# Patient Record
Sex: MALE | Race: WHITE | Employment: FULL TIME | URBAN - METROPOLITAN AREA
[De-identification: names, ages, dates, MRNs, and addresses within clinical notes are randomized per-mention and may not be internally consistent; named-entity substitution may affect disease eponyms.]

---

## 2017-05-25 ENCOUNTER — HOSPITAL ENCOUNTER (EMERGENCY)
Age: 52
Discharge: HOME | End: 2017-05-25
Attending: EMERGENCY MEDICINE
Payer: OTHER GOVERNMENT

## 2017-05-25 VITALS
HEART RATE: 77 BPM | OXYGEN SATURATION: 97 % | HEIGHT: 71 IN | BODY MASS INDEX: 35 KG/M2 | SYSTOLIC BLOOD PRESSURE: 158 MMHG | DIASTOLIC BLOOD PRESSURE: 95 MMHG | WEIGHT: 250 LBS | RESPIRATION RATE: 18 BRPM | TEMPERATURE: 98 F

## 2017-05-25 DIAGNOSIS — M54.12 CERVICAL RADICULOPATHY: Primary | ICD-10-CM

## 2017-05-25 PROCEDURE — 99283 EMERGENCY DEPT VISIT LOW MDM: CPT

## 2017-05-25 RX ORDER — METHYLPREDNISOLONE 4 MG/1
TABLET ORAL
Qty: 1 PACKAGE | Refills: 0 | Status: SHIPPED | OUTPATIENT
Start: 2017-05-25 | End: 2017-05-30

## 2017-05-25 RX ORDER — NAPROXEN 500 MG/1
500 TABLET ORAL 2 TIMES DAILY PRN
Qty: 20 TABLET | Refills: 0 | Status: SHIPPED | OUTPATIENT
Start: 2017-05-25 | End: 2017-06-01

## 2017-05-25 NOTE — ED INITIAL ASSESSMENT (HPI)
LEFT SHOULDER PAIN RADIATE TO ARM AND HAND DENIES INJURY, STATES HE HAD THIS BEFORE WITH A TORN MUSCLE

## 2017-05-25 NOTE — ED PROVIDER NOTES
Patient Seen in: THE [facility] Emergency Department In [place]    History   Patient presents with:  Upper Extremity Injury (musculoskeletal)    Stated Complaint: left shoulder pain times three days     HPI    51-year-old male presents the emergency departmen Normocephalic. No evidence of trauma. Extraocular movements are intact. Pupils equally round reactive to light. Cervical spine is nontender. He has some tenderness along his left trapezius muscle group.   Cardiovascular exam: Regular rate and rhythm  Angela

## 2018-01-01 ENCOUNTER — APPOINTMENT (OUTPATIENT)
Dept: MRI IMAGING | Age: 53
DRG: 543 | End: 2018-01-01
Attending: EMERGENCY MEDICINE
Payer: COMMERCIAL

## 2018-01-01 ENCOUNTER — APPOINTMENT (OUTPATIENT)
Dept: CT IMAGING | Facility: HOSPITAL | Age: 53
DRG: 543 | End: 2018-01-01
Attending: HOSPITALIST
Payer: COMMERCIAL

## 2018-01-01 ENCOUNTER — HOSPITAL ENCOUNTER (OUTPATIENT)
Facility: HOSPITAL | Age: 53
Setting detail: OBSERVATION
Discharge: HOME | DRG: 543 | End: 2018-01-01
Attending: EMERGENCY MEDICINE | Admitting: INTERNAL MEDICINE
Payer: COMMERCIAL

## 2018-01-01 ENCOUNTER — APPOINTMENT (OUTPATIENT)
Dept: GENERAL RADIOLOGY | Facility: HOSPITAL | Age: 53
DRG: 543 | End: 2018-01-01
Attending: HOSPITALIST
Payer: COMMERCIAL

## 2018-01-01 VITALS
HEIGHT: 71 IN | BODY MASS INDEX: 28.02 KG/M2 | OXYGEN SATURATION: 98 % | TEMPERATURE: 99 F | SYSTOLIC BLOOD PRESSURE: 108 MMHG | DIASTOLIC BLOOD PRESSURE: 76 MMHG | RESPIRATION RATE: 18 BRPM | HEART RATE: 100 BPM | WEIGHT: 200.13 LBS

## 2018-01-01 DIAGNOSIS — S22.058A OTHER CLOSED FRACTURE OF SIXTH THORACIC VERTEBRA, INITIAL ENCOUNTER (HCC): ICD-10-CM

## 2018-01-01 DIAGNOSIS — C79.9 METASTATIC CANCER (HCC): ICD-10-CM

## 2018-01-01 DIAGNOSIS — R52 INTRACTABLE PAIN: Primary | ICD-10-CM

## 2018-01-01 PROCEDURE — 72148 MRI LUMBAR SPINE W/O DYE: CPT | Performed by: EMERGENCY MEDICINE

## 2018-01-01 PROCEDURE — 72129 CT CHEST SPINE W/DYE: CPT | Performed by: HOSPITALIST

## 2018-01-01 PROCEDURE — 99233 SBSQ HOSP IP/OBS HIGH 50: CPT | Performed by: HOSPITALIST

## 2018-01-01 PROCEDURE — 72146 MRI CHEST SPINE W/O DYE: CPT | Performed by: EMERGENCY MEDICINE

## 2018-01-01 PROCEDURE — 99239 HOSP IP/OBS DSCHRG MGMT >30: CPT | Performed by: HOSPITALIST

## 2018-01-01 PROCEDURE — 72072 X-RAY EXAM THORAC SPINE 3VWS: CPT | Performed by: HOSPITALIST

## 2018-01-01 PROCEDURE — 99223 1ST HOSP IP/OBS HIGH 75: CPT | Performed by: HOSPITALIST

## 2018-01-01 RX ORDER — MORPHINE SULFATE 4 MG/ML
4 INJECTION, SOLUTION INTRAMUSCULAR; INTRAVENOUS EVERY 30 MIN PRN
Status: DISCONTINUED | OUTPATIENT
Start: 2018-01-01 | End: 2018-01-01

## 2018-01-01 RX ORDER — MORPHINE SULFATE 10 MG/ML
10 INJECTION, SOLUTION INTRAMUSCULAR; INTRAVENOUS ONCE
Status: COMPLETED | OUTPATIENT
Start: 2018-01-01 | End: 2018-01-01

## 2018-01-01 RX ORDER — FINASTERIDE 5 MG/1
5 TABLET, FILM COATED ORAL DAILY
COMMUNITY

## 2018-01-01 RX ORDER — SODIUM PHOSPHATE, DIBASIC AND SODIUM PHOSPHATE, MONOBASIC 7; 19 G/133ML; G/133ML
1 ENEMA RECTAL ONCE AS NEEDED
Status: DISCONTINUED | OUTPATIENT
Start: 2018-01-01 | End: 2018-01-01

## 2018-01-01 RX ORDER — FINASTERIDE 5 MG/1
5 TABLET, FILM COATED ORAL DAILY
Status: DISCONTINUED | OUTPATIENT
Start: 2018-01-01 | End: 2018-01-01

## 2018-01-01 RX ORDER — LIDOCAINE 50 MG/G
1 PATCH TOPICAL EVERY 24 HOURS
Qty: 10 PATCH | Refills: 0 | Status: SHIPPED | OUTPATIENT
Start: 2018-01-01

## 2018-01-01 RX ORDER — LISINOPRIL 10 MG/1
20 TABLET ORAL DAILY
Status: DISCONTINUED | OUTPATIENT
Start: 2018-01-01 | End: 2018-01-01

## 2018-01-01 RX ORDER — LORAZEPAM 0.5 MG/1
0.5 TABLET ORAL EVERY 4 HOURS PRN
Status: DISCONTINUED | OUTPATIENT
Start: 2018-01-01 | End: 2018-01-01

## 2018-01-01 RX ORDER — ONDANSETRON 2 MG/ML
4 INJECTION INTRAMUSCULAR; INTRAVENOUS EVERY 6 HOURS PRN
Status: DISCONTINUED | OUTPATIENT
Start: 2018-01-01 | End: 2018-01-01

## 2018-01-01 RX ORDER — ACETAMINOPHEN 325 MG/1
650 TABLET ORAL EVERY 6 HOURS PRN
Status: DISCONTINUED | OUTPATIENT
Start: 2018-01-01 | End: 2018-01-01

## 2018-01-01 RX ORDER — POLYETHYLENE GLYCOL 3350 17 G/17G
17 POWDER, FOR SOLUTION ORAL DAILY PRN
Status: DISCONTINUED | OUTPATIENT
Start: 2018-01-01 | End: 2018-01-01

## 2018-01-01 RX ORDER — ZOLPIDEM TARTRATE 5 MG/1
5 TABLET ORAL NIGHTLY PRN
Status: ON HOLD | COMMUNITY
End: 2018-01-01

## 2018-01-01 RX ORDER — OXYCODONE HYDROCHLORIDE 15 MG/1
15 TABLET, FILM COATED, EXTENDED RELEASE ORAL EVERY 12 HOURS
Qty: 14 TABLET | Refills: 0 | Status: SHIPPED | OUTPATIENT
Start: 2018-01-01 | End: 2018-01-01

## 2018-01-01 RX ORDER — OXYCODONE HCL 10 MG/1
10 TABLET, FILM COATED, EXTENDED RELEASE ORAL EVERY 12 HOURS
Status: DISCONTINUED | OUTPATIENT
Start: 2018-01-01 | End: 2018-01-01

## 2018-01-01 RX ORDER — LORAZEPAM 2 MG/ML
1 INJECTION INTRAMUSCULAR ONCE
Status: COMPLETED | OUTPATIENT
Start: 2018-01-01 | End: 2018-01-01

## 2018-01-01 RX ORDER — HYDROMORPHONE HYDROCHLORIDE 1 MG/ML
0.8 INJECTION, SOLUTION INTRAMUSCULAR; INTRAVENOUS; SUBCUTANEOUS EVERY 2 HOUR PRN
Status: DISCONTINUED | OUTPATIENT
Start: 2018-01-01 | End: 2018-01-01

## 2018-01-01 RX ORDER — OXYCODONE HYDROCHLORIDE 5 MG/1
5 TABLET ORAL EVERY 4 HOURS PRN
Status: DISCONTINUED | OUTPATIENT
Start: 2018-01-01 | End: 2018-01-01

## 2018-01-01 RX ORDER — METOCLOPRAMIDE HYDROCHLORIDE 5 MG/ML
10 INJECTION INTRAMUSCULAR; INTRAVENOUS EVERY 8 HOURS PRN
Status: DISCONTINUED | OUTPATIENT
Start: 2018-01-01 | End: 2018-01-01

## 2018-01-01 RX ORDER — ZOLPIDEM TARTRATE 5 MG/1
5 TABLET ORAL NIGHTLY PRN
Status: DISCONTINUED | OUTPATIENT
Start: 2018-01-01 | End: 2018-01-01

## 2018-01-01 RX ORDER — HEPARIN SODIUM 5000 [USP'U]/ML
5000 INJECTION, SOLUTION INTRAVENOUS; SUBCUTANEOUS EVERY 8 HOURS SCHEDULED
Status: DISCONTINUED | OUTPATIENT
Start: 2018-01-01 | End: 2018-01-01

## 2018-01-01 RX ORDER — ALFUZOSIN HYDROCHLORIDE 10 MG/1
10 TABLET, EXTENDED RELEASE ORAL DAILY
Status: DISCONTINUED | OUTPATIENT
Start: 2018-01-01 | End: 2018-01-01

## 2018-01-01 RX ORDER — OXYCODONE HYDROCHLORIDE 5 MG/1
5 TABLET ORAL
Status: DISCONTINUED | OUTPATIENT
Start: 2018-01-01 | End: 2018-01-01

## 2018-01-01 RX ORDER — DOCUSATE SODIUM 100 MG/1
100 CAPSULE, LIQUID FILLED ORAL 2 TIMES DAILY
Status: DISCONTINUED | OUTPATIENT
Start: 2018-01-01 | End: 2018-01-01

## 2018-01-01 RX ORDER — OXYCODONE HYDROCHLORIDE 5 MG/1
5 TABLET ORAL EVERY 4 HOURS PRN
Qty: 30 TABLET | Refills: 0 | Status: SHIPPED | OUTPATIENT
Start: 2018-01-01

## 2018-01-01 RX ORDER — HYDROMORPHONE HYDROCHLORIDE 1 MG/ML
0.2 INJECTION, SOLUTION INTRAMUSCULAR; INTRAVENOUS; SUBCUTANEOUS
Status: DISCONTINUED | OUTPATIENT
Start: 2018-01-01 | End: 2018-01-01

## 2018-01-01 RX ORDER — SODIUM CHLORIDE 9 MG/ML
INJECTION, SOLUTION INTRAVENOUS CONTINUOUS
Status: DISCONTINUED | OUTPATIENT
Start: 2018-01-01 | End: 2018-01-01

## 2018-01-01 RX ORDER — BISACODYL 10 MG
10 SUPPOSITORY, RECTAL RECTAL
Status: DISCONTINUED | OUTPATIENT
Start: 2018-01-01 | End: 2018-01-01

## 2018-01-01 RX ORDER — OXYCODONE HYDROCHLORIDE 15 MG/1
15 TABLET ORAL
Status: ON HOLD | COMMUNITY
End: 2018-01-01

## 2018-01-01 RX ORDER — POLYETHYLENE GLYCOL 3350 17 G/17G
17 POWDER, FOR SOLUTION ORAL DAILY PRN
Qty: 30 EACH | Refills: 0 | Status: SHIPPED | OUTPATIENT
Start: 2018-01-01

## 2018-01-01 RX ORDER — HYDROMORPHONE HYDROCHLORIDE 1 MG/ML
0.2 INJECTION, SOLUTION INTRAMUSCULAR; INTRAVENOUS; SUBCUTANEOUS EVERY 6 HOURS PRN
Status: DISCONTINUED | OUTPATIENT
Start: 2018-01-01 | End: 2018-01-01

## 2018-01-01 RX ORDER — DULOXETIN HYDROCHLORIDE 30 MG/1
30 CAPSULE, DELAYED RELEASE ORAL DAILY
COMMUNITY

## 2018-01-01 RX ORDER — HYDROMORPHONE HYDROCHLORIDE 1 MG/ML
0.2 INJECTION, SOLUTION INTRAMUSCULAR; INTRAVENOUS; SUBCUTANEOUS EVERY 2 HOUR PRN
Status: DISCONTINUED | OUTPATIENT
Start: 2018-01-01 | End: 2018-01-01

## 2018-01-01 RX ORDER — OXYCODONE HYDROCHLORIDE 15 MG/1
15 TABLET, FILM COATED, EXTENDED RELEASE ORAL EVERY 12 HOURS
Status: DISCONTINUED | OUTPATIENT
Start: 2018-01-01 | End: 2018-01-01

## 2018-01-01 RX ORDER — OXYCODONE HCL 20 MG/1
20 TABLET, FILM COATED, EXTENDED RELEASE ORAL EVERY 12 HOURS
Status: DISCONTINUED | OUTPATIENT
Start: 2018-01-01 | End: 2018-01-01

## 2018-01-01 RX ORDER — LIDOCAINE 50 MG/G
1 PATCH TOPICAL EVERY 24 HOURS
Status: DISCONTINUED | OUTPATIENT
Start: 2018-01-01 | End: 2018-01-01

## 2018-01-01 RX ORDER — HYDROMORPHONE HYDROCHLORIDE 1 MG/ML
0.4 INJECTION, SOLUTION INTRAMUSCULAR; INTRAVENOUS; SUBCUTANEOUS EVERY 2 HOUR PRN
Status: DISCONTINUED | OUTPATIENT
Start: 2018-01-01 | End: 2018-01-01

## 2018-01-01 RX ORDER — PSEUDOEPHEDRINE HCL 30 MG
100 TABLET ORAL 2 TIMES DAILY
Qty: 60 CAPSULE | Refills: 0 | Status: SHIPPED | OUTPATIENT
Start: 2018-01-01

## 2018-01-01 RX ORDER — FENTANYL 100 UG/H
1 PATCH TRANSDERMAL
COMMUNITY

## 2018-01-01 RX ORDER — OXYCODONE HYDROCHLORIDE 15 MG/1
15 TABLET ORAL EVERY 8 HOURS PRN
Status: DISCONTINUED | OUTPATIENT
Start: 2018-01-01 | End: 2018-01-01

## 2018-01-01 RX ORDER — DULOXETIN HYDROCHLORIDE 30 MG/1
30 CAPSULE, DELAYED RELEASE ORAL DAILY
Status: DISCONTINUED | OUTPATIENT
Start: 2018-01-01 | End: 2018-01-01

## 2018-01-01 RX ORDER — LORAZEPAM 0.5 MG/1
0.5 TABLET ORAL EVERY 4 HOURS PRN
COMMUNITY

## 2018-01-01 RX ORDER — FENTANYL 100 UG/H
1 PATCH TRANSDERMAL
Status: DISCONTINUED | OUTPATIENT
Start: 2018-01-01 | End: 2018-01-01

## 2018-01-22 ENCOUNTER — HOSPITAL ENCOUNTER (EMERGENCY)
Age: 53
Discharge: HOME | End: 2018-01-22
Attending: EMERGENCY MEDICINE
Payer: COMMERCIAL

## 2018-01-22 ENCOUNTER — APPOINTMENT (OUTPATIENT)
Dept: CT IMAGING | Age: 53
End: 2018-01-22
Attending: NURSE PRACTITIONER
Payer: COMMERCIAL

## 2018-01-22 ENCOUNTER — APPOINTMENT (OUTPATIENT)
Dept: CT IMAGING | Facility: HOSPITAL | Age: 53
End: 2018-01-22
Attending: NURSE PRACTITIONER
Payer: COMMERCIAL

## 2018-01-22 VITALS
RESPIRATION RATE: 16 BRPM | OXYGEN SATURATION: 97 % | TEMPERATURE: 99 F | SYSTOLIC BLOOD PRESSURE: 151 MMHG | DIASTOLIC BLOOD PRESSURE: 92 MMHG | WEIGHT: 265 LBS | BODY MASS INDEX: 37.1 KG/M2 | HEART RATE: 84 BPM | HEIGHT: 71 IN

## 2018-01-22 DIAGNOSIS — N28.89 RENAL MASS, RIGHT: Primary | ICD-10-CM

## 2018-01-22 LAB
ALBUMIN SERPL-MCNC: 3.8 G/DL (ref 3.5–4.8)
ALP LIVER SERPL-CCNC: 82 U/L (ref 45–117)
ALT SERPL-CCNC: 32 U/L (ref 17–63)
AST SERPL-CCNC: 22 U/L (ref 15–41)
BASOPHILS # BLD AUTO: 0.05 X10(3) UL (ref 0–0.1)
BASOPHILS NFR BLD AUTO: 0.6 %
BILIRUB SERPL-MCNC: 0.3 MG/DL (ref 0.1–2)
BILIRUB UR QL STRIP.AUTO: NEGATIVE
BUN BLD-MCNC: 13 MG/DL (ref 8–20)
CALCIUM BLD-MCNC: 9.6 MG/DL (ref 8.3–10.3)
CHLORIDE: 103 MMOL/L (ref 101–111)
CLARITY UR REFRACT.AUTO: CLEAR
CO2: 28 MMOL/L (ref 22–32)
COLOR UR AUTO: YELLOW
CREAT BLD-MCNC: 0.94 MG/DL (ref 0.7–1.3)
EOSINOPHIL # BLD AUTO: 0.14 X10(3) UL (ref 0–0.3)
EOSINOPHIL NFR BLD AUTO: 1.7 %
ERYTHROCYTE [DISTWIDTH] IN BLOOD BY AUTOMATED COUNT: 13.5 % (ref 11.5–16)
GLUCOSE BLD-MCNC: 97 MG/DL (ref 70–99)
GLUCOSE UR STRIP.AUTO-MCNC: NEGATIVE MG/DL
HCT VFR BLD AUTO: 47 % (ref 37–53)
HGB BLD-MCNC: 15.4 G/DL (ref 13–17)
IMMATURE GRANULOCYTE COUNT: 0.04 X10(3) UL (ref 0–1)
IMMATURE GRANULOCYTE RATIO %: 0.5 %
KETONES UR STRIP.AUTO-MCNC: NEGATIVE MG/DL
LEUKOCYTE ESTERASE UR QL STRIP.AUTO: NEGATIVE
LIPASE: 134 U/L (ref 73–393)
LYMPHOCYTES # BLD AUTO: 1.68 X10(3) UL (ref 0.9–4)
LYMPHOCYTES NFR BLD AUTO: 20.8 %
M PROTEIN MFR SERPL ELPH: 7.7 G/DL (ref 6.1–8.3)
MCH RBC QN AUTO: 28.5 PG (ref 27–33.2)
MCHC RBC AUTO-ENTMCNC: 32.8 G/DL (ref 31–37)
MCV RBC AUTO: 86.9 FL (ref 80–99)
MONOCYTES # BLD AUTO: 0.69 X10(3) UL (ref 0.1–0.6)
MONOCYTES NFR BLD AUTO: 8.6 %
NEUTROPHIL ABS PRELIM: 5.46 X10 (3) UL (ref 1.3–6.7)
NEUTROPHILS # BLD AUTO: 5.46 X10(3) UL (ref 1.3–6.7)
NEUTROPHILS NFR BLD AUTO: 67.8 %
NITRITE UR QL STRIP.AUTO: NEGATIVE
PH UR STRIP.AUTO: 5 [PH] (ref 4.5–8)
PLATELET # BLD AUTO: 278 10(3)UL (ref 150–450)
POTASSIUM SERPL-SCNC: 4 MMOL/L (ref 3.6–5.1)
PROT UR STRIP.AUTO-MCNC: NEGATIVE MG/DL
RBC # BLD AUTO: 5.41 X10(6)UL (ref 4.3–5.7)
RED CELL DISTRIBUTION WIDTH-SD: 43.1 FL (ref 35.1–46.3)
SODIUM SERPL-SCNC: 138 MMOL/L (ref 136–144)
SP GR UR STRIP.AUTO: 1.02 (ref 1–1.03)
UROBILINOGEN UR STRIP.AUTO-MCNC: 0.2 MG/DL
WBC # BLD AUTO: 8.1 X10(3) UL (ref 4–13)

## 2018-01-22 PROCEDURE — 99285 EMERGENCY DEPT VISIT HI MDM: CPT

## 2018-01-22 PROCEDURE — 81003 URINALYSIS AUTO W/O SCOPE: CPT | Performed by: NURSE PRACTITIONER

## 2018-01-22 PROCEDURE — 96361 HYDRATE IV INFUSION ADD-ON: CPT

## 2018-01-22 PROCEDURE — 74176 CT ABD & PELVIS W/O CONTRAST: CPT | Performed by: NURSE PRACTITIONER

## 2018-01-22 PROCEDURE — 83690 ASSAY OF LIPASE: CPT | Performed by: NURSE PRACTITIONER

## 2018-01-22 PROCEDURE — 96360 HYDRATION IV INFUSION INIT: CPT

## 2018-01-22 PROCEDURE — 85025 COMPLETE CBC W/AUTO DIFF WBC: CPT | Performed by: NURSE PRACTITIONER

## 2018-01-22 PROCEDURE — 80053 COMPREHEN METABOLIC PANEL: CPT | Performed by: NURSE PRACTITIONER

## 2018-01-22 PROCEDURE — 74178 CT ABD&PLV WO CNTR FLWD CNTR: CPT | Performed by: NURSE PRACTITIONER

## 2018-01-22 PROCEDURE — 99284 EMERGENCY DEPT VISIT MOD MDM: CPT

## 2018-01-22 RX ORDER — SODIUM CHLORIDE 9 MG/ML
125 INJECTION, SOLUTION INTRAVENOUS CONTINUOUS
Status: DISCONTINUED | OUTPATIENT
Start: 2018-01-22 | End: 2018-01-22

## 2018-01-22 RX ORDER — LISINOPRIL 20 MG/1
20 TABLET ORAL DAILY
COMMUNITY

## 2018-01-22 NOTE — ED INITIAL ASSESSMENT (HPI)
Back pain denies injury started this afternoon states flank pain that radiates to the front - denies urinary symptoms

## 2018-01-23 NOTE — ED PROVIDER NOTES
Patient Seen in: THE [facility] Emergency Department In [place]    History   Patient presents with:  Back Pain (musculoskeletal)    Stated Complaint: back pain     Very pleasant 51-year-old male who presents to the emergency room with bilateral flank pain jeffrey negative. Positive for stated complaint: back pain   Other systems are as noted in HPI. Constitutional and vital signs reviewed. All other systems reviewed and negative except as noted above.     Physical Exam   ED Triage Vitals [01/22/18 1645] unremarkable.   Labs Reviewed   URINALYSIS WITH CULTURE REFLEX - Abnormal; Notable for the following:        Result Value    Blood Urine Trace-lysed (*)     All other components within normal limits   CBC W/ DIFFERENTIAL - Abnormal; Notable for the followin dimension 8.4 cm. Displacement and splaying of the collecting system of the right kidney along its mid and lower aspect, from the mass effect of the lesion. No hydronephrosis.    The right renal vein is not dilated and shows no evidence for intrinsic tumo contrast.  Post contrast coronal MPR imaging was performed. Dose reduction techniques were used. Dose information is transmitted to the [facility]) NRDR ([address]) which includes the Dose Index Registry.   PA cell carcinoma. A nonspecific low-attenuation liver lesion is present. No evidence for renal vein tumor thrombosis or vena cava tumor thrombosis.    Dictated by: Serena Desouza MD on 1/22/2018 at 19:16     Approved by: MD ADRIAN Caro

## 2018-04-03 ENCOUNTER — APPOINTMENT (OUTPATIENT)
Dept: CT IMAGING | Age: 53
End: 2018-04-03
Attending: EMERGENCY MEDICINE
Payer: COMMERCIAL

## 2018-04-03 ENCOUNTER — HOSPITAL ENCOUNTER (EMERGENCY)
Age: 53
Discharge: HOME | End: 2018-04-03
Attending: EMERGENCY MEDICINE
Payer: COMMERCIAL

## 2018-04-03 VITALS
WEIGHT: 252 LBS | BODY MASS INDEX: 35.28 KG/M2 | HEART RATE: 84 BPM | OXYGEN SATURATION: 98 % | TEMPERATURE: 98 F | DIASTOLIC BLOOD PRESSURE: 82 MMHG | RESPIRATION RATE: 14 BRPM | HEIGHT: 71 IN | SYSTOLIC BLOOD PRESSURE: 125 MMHG

## 2018-04-03 DIAGNOSIS — R10.9 ABDOMINAL PAIN OF UNKNOWN ETIOLOGY: Primary | ICD-10-CM

## 2018-04-03 PROCEDURE — 81003 URINALYSIS AUTO W/O SCOPE: CPT | Performed by: EMERGENCY MEDICINE

## 2018-04-03 PROCEDURE — 99284 EMERGENCY DEPT VISIT MOD MDM: CPT

## 2018-04-03 PROCEDURE — 80053 COMPREHEN METABOLIC PANEL: CPT | Performed by: EMERGENCY MEDICINE

## 2018-04-03 PROCEDURE — 85025 COMPLETE CBC W/AUTO DIFF WBC: CPT | Performed by: EMERGENCY MEDICINE

## 2018-04-03 PROCEDURE — 96374 THER/PROPH/DIAG INJ IV PUSH: CPT

## 2018-04-03 PROCEDURE — 74177 CT ABD & PELVIS W/CONTRAST: CPT | Performed by: EMERGENCY MEDICINE

## 2018-04-03 PROCEDURE — 96375 TX/PRO/DX INJ NEW DRUG ADDON: CPT

## 2018-04-03 RX ORDER — ONDANSETRON 2 MG/ML
4 INJECTION INTRAMUSCULAR; INTRAVENOUS ONCE
Status: COMPLETED | OUTPATIENT
Start: 2018-04-03 | End: 2018-04-03

## 2018-04-03 RX ORDER — MORPHINE SULFATE 4 MG/ML
4 INJECTION, SOLUTION INTRAMUSCULAR; INTRAVENOUS EVERY 30 MIN PRN
Status: DISCONTINUED | OUTPATIENT
Start: 2018-04-03 | End: 2018-04-03

## 2018-04-04 NOTE — ED INITIAL ASSESSMENT (HPI)
Patient presents with right abdominal pain x 2 weeks. Patient states over the last few days pain has worsened. Patient states he has had chills.

## 2018-04-04 NOTE — ED PROVIDER NOTES
Patient Seen in: [facility] In [place]    History   Patient presents with:  Abdomen/Flank Pain (GI/)    Stated Complaint: ABD PAIN     HPI    This is a pleasant 51-year-old male coming with complaints of right-sided abdominal pain. exam shows a regular rate and rhythm  Abdomen soft nontender with no rebound tenderness noted  Extremity exam shows no clubbing cyanosis or edema  Skin exam shows no rashes or lacerations  Neuro exam shows no focal deficits  Back exam shows no tenderness

## 2018-12-21 PROBLEM — E87.1 HYPONATREMIA: Status: ACTIVE | Noted: 2018-01-01

## 2018-12-21 PROBLEM — E87.2 METABOLIC ACIDOSIS: Status: ACTIVE | Noted: 2018-01-01

## 2018-12-21 PROBLEM — R52 INTRACTABLE PAIN: Status: ACTIVE | Noted: 2018-01-01

## 2018-12-21 PROBLEM — C79.9 METASTATIC CANCER (HCC): Status: ACTIVE | Noted: 2018-01-01

## 2018-12-21 PROBLEM — S22.058A: Status: ACTIVE | Noted: 2018-01-01

## 2018-12-21 PROBLEM — R73.9 HYPERGLYCEMIA: Status: ACTIVE | Noted: 2018-01-01

## 2018-12-21 NOTE — ED INITIAL ASSESSMENT (HPI)
Pt was discharged today from [place] [address] for pain management and anxiety.  Family states he is having another panic attac today

## 2018-12-21 NOTE — ED PROVIDER NOTES
Patient Seen in: [facility] Emergency Department In [place]    History   Patient presents with:   Anxiety/Panic attack (neurologic)    Stated Complaint: panic attack    HPI    Severe low back pain and feels panicky- being treated for metastatic renal CA- s tobacco: Never Used    Alcohol use: Not on file    Drug use: Not on file      Review of Systems    Positive for stated complaint: panic attack  Other systems are as noted in HPI. Constitutional and vital signs reviewed.       All other systems reviewed and 20.5 (*)     RDW-SD 59.0 (*)     Lymphocyte Absolute 0.55 (*)     All other components within normal limits   CBC WITH DIFFERENTIAL WITH PLATELET    Narrative: The following orders were created for panel order CBC WITH DIFFERENTIAL WITH PLATELET.   Proc transfer the patient to [facility] and then [facility], there were no beds available. I spoke with the patient's oncologist multiple different times.   Dr. Agatha Fernandez would really consult on the patient if we could        Disposition and Pl

## 2018-12-22 PROBLEM — C64.2 RENAL CELL CARCINOMA OF LEFT KIDNEY (HCC): Chronic | Status: ACTIVE | Noted: 2018-01-01

## 2018-12-22 PROBLEM — I10 ESSENTIAL HYPERTENSION: Chronic | Status: ACTIVE | Noted: 2018-01-01

## 2018-12-22 PROBLEM — S22.050K: Status: ACTIVE | Noted: 2018-01-01

## 2018-12-22 NOTE — PLAN OF CARE
ANXIETY    • Will report anxiety at manageable levels Progressing        CARDIOVASCULAR - ADULT    • Absence of cardiac arrhythmias or at baseline Progressing        COPING    • Pt/Family able to verbalize concerns and demonstrate effective coping strategi hospital for medical records from previous hospitalization. Pt requesting to not be DNR anymore. MD paged and order for full code placed   Bed rest until TLSO brace arrives   Will continue to monitor     Neurosurgery consulted for T6 fracture.  Spoke with

## 2018-12-22 NOTE — PROGRESS NOTES
[facility] Resource Referral Counselor Note    Durant Aravind Miranda Patient Status:  Observation    1965 MRN [id number]   [facility] 3NE-A Attending Gunner Rivera MD   Hosp Day # 0 PCP NORA LAGUERRE(subjective) \"I am morro

## 2018-12-22 NOTE — PLAN OF CARE
[facility]. Arrived via [facility] ambulance service at approximately 2300. Pt. Is Ax4, calm, cooperative, pleasant, very sad and depressed.   Dilaudid for pain via MAR  SCD's and Heparin SubQ  Regular diet-dieta ADULT    • Absence of fever/infection during anticipated neutropenic period Progressing        SAFETY ADULT - FALL    • Free from fall injury Progressing        SKIN/TISSUE INTEGRITY - ADULT    • Incision(s), wounds(s) or drain site(s) healing without S/S

## 2018-12-22 NOTE — H&P
[facility]  History and Physical     Kemi Miranda Patient Status:  Emergency    1965 MRN [id number]   [facility] [place] Attending Fatoumata Granados MD   Hosp Day # 0 PCP Gisselle Garcia     Chief Complaint: in Oral Tab Take 0.5 mg by mouth every 4 (four) hours as needed for Anxiety. Disp:  Rfl:    Silodosin (RAPAFLO OR) Take by mouth. Disp:  Rfl:    lisinopril 20 MG Oral Tab Take 20 mg by mouth daily.  Disp:  Rfl:        Review of Systems:   A comprehensive 14 po ordered of thoracic spine. Will continue IV pain medication and continue the 100mcg fentanyl patch.   2. Metastatic renal cell carcinoma-patient getting all his treatment done through [facility].  Patient plans to continue his care throug

## 2018-12-22 NOTE — PROGRESS NOTES
[facility]  Progress Note     Christina Miranda Patient Status:  Observation    1965 MRN [id number]   [facility] 3NE-A Attending Deejay Christian MD   Hosp Day # 0 PCP Rigoberto Leyden     Chief Complaint: Back pain    S: Patient admi docusate sodium  100 mg Oral BID       ASSESSMENT / PLAN:     1. Metastatic renal cell carcinoma to bone sp resection of T9-12 and cage followed by radiation therapy. Patient currently on third line therapy.  He has had cryoablation of left iliac now with a

## 2018-12-23 NOTE — PROGRESS NOTES
[facility]  Progress Note     Tobias Miranda Patient Status:  Observation    1965 MRN [id number]   [facility] 3NE-A Attending Jacob Aguilar MD   Hosp Day # 0 PCP Jarrell Flores     Chief Complaint: Back pain    S: Patient admi HCl ER  10 mg Oral Daily   • lidocaine  1 patch Transdermal Q24H   • docusate sodium  100 mg Oral BID       ASSESSMENT / PLAN:     1. Metastatic renal cell carcinoma to bone sp resection of T9-12 and cage followed by radiation therapy.  Patient currently on

## 2018-12-23 NOTE — CONSULTS
Patient: [address] [place] [address] Record Number: [id number]  Referring Physician:  No ref.  provider found  PCP: NORA ROSADO    HISTORY OF CHIEF COMPLAINT:    CC:Metastatic Thoracic Pathologic Fracture    HPI: Marybeth Joie is a 48year old male hx o On examination he is sitting up in bed in no acute distress. He has TTP overlying the thoracic spine. He has a well healed prior thoracic wound caudal to his tenderness. Manual motor testing demonstrates 5/5 strength throught BLE. SILT L2-S1.  No long tract

## 2018-12-23 NOTE — PLAN OF CARE
ANXIETY    • Will report anxiety at manageable levels Progressing        CARDIOVASCULAR - ADULT    • Absence of cardiac arrhythmias or at baseline Progressing        COPING    • Pt/Family able to verbalize concerns and demonstrate effective coping strategi stable. Awake and asymptomatic. Paged Dr Elvira Jones and he ordered to check Magnesium stat. K+ was normal.  Labs were drawn stat.

## 2018-12-23 NOTE — PROGRESS NOTES
Received patient at 0730  A/O x 4  NSR/ST on tele  Patient reports pain 7/10 this morning  Pain improved to 5/10 with IV dilaudid but then able to sleep without incident  Receiving scheduled Oxycontin ER  Awaiting TLSO brace (fitted yesterday)  Per Spine s

## 2018-12-24 NOTE — PHYSICAL THERAPY NOTE
PHYSICAL THERAPY QUICK EVALUATION - INPATIENT    Room Number: 4604/8066-V  Evaluation Date: 12/24/2018  Presenting Problem: back pain, pathologic T6 fracture   Physician Order: PT Eval and Treat    Problem List  Principal Problem:    Intractable pain  Ac '6-Clicks' INPATIENT SHORT FORM - BASIC MOBILITY  How much difficulty does the patient currently have. ..  -   Turning over in bed (including adjusting bedclothes, sheets and blankets)?: None   -   Sitting down on and standing up from a chair with arms (e.g patient's clinical presentation is stable and overall evaluation complexity is considered low. Pt currently at MOD I level for bed mobility, transfers, ambulation, stair negotiation, and donning TLSO. Pt does not require further skilled IP PT at this time.

## 2018-12-24 NOTE — PAYOR COMM NOTE
--------------  ADMISSION REVIEW     Payor: [address]  Subscriber #:  [id number]  Authorization Number: N/A    Admit date: N/A  Admit time: N/A       Admitting Physician: Glenys Vazquez MD  Attending Physician:  Amee Sadler MD  Primary emergency department. His oncologist whom I called upon his arrival  stated the patient is generally pretty stoic.   The patient's wife is the oncologist medical assistant    Past Medical History:   Diagnosis Date   • Cancer of kidney ([facility] [place] [address] 02/06/2018   • 1.42 (*)     All other components within normal limits   COMP METABOLIC PANEL (14) - Abnormal; Notable for the following components:    Glucose 101 (*)     Sodium 134 (*)     CO2 21.0 (*)     AST 62 (*)     Alt 70 (*)     Alkaline Phosphatase 139 (*)     A then [facility], there were no beds available. I spoke with the patient's oncologist multiple different times.   Dr. Lexy Sorto would really consult on the patient if we could    Disposition and Plan     Clinical Impression:  Intractable pain  (pr History:  As noted above in ED MD Assessment     Allergies:   Peanuts                     Medications:    No current facility-administered medications on file prior to encounter.    Current Outpatient Medications on File Prior to Encounter:  fentaNYL 100 MC PLT  217.0       Recent Labs   Lab  12/21/18   1742   GLU  101*   BUN  10   CREATSERUM  0.92   GFRAA  109   GFRNAA  95   CA  8.7   ALB  3.3   NA  134*   K  3.9   CL  102   CO2  21.0*   ALKPHO  139*   AST  62*   ALT  70*   BILT  1.0   TP  7.3   Estimated Intravenous Blanca Spurling, RN    12/23/2018 1544 Given 0.2 mg Intravenous Anika Gibbs RN      lidocaine (LIDODERM) 5 % 1 patch     Date Action Dose Route User    12/24/2018 0528 Patch Applied 1 patch Transdermal (Mid Back) Sasha Curry, RN      LORazepam (ATIV

## 2018-12-24 NOTE — PLAN OF CARE
ANXIETY    • Will report anxiety at manageable levels Progressing        CARDIOVASCULAR - ADULT    • Absence of cardiac arrhythmias or at baseline Progressing        COPING    • Pt/Family able to verbalize concerns and demonstrate effective coping strategi to effectively weigh alternatives and participate in decision making related to treatment and care Progressing        DISCHARGE PLANNING    • Discharge to home or other facility with appropriate resources Progressing        GASTROINTESTINAL - ADULT    • Mi

## 2018-12-24 NOTE — DISCHARGE SUMMARY
[facility] [place] [facility]  DISCHARGE SUMMARY     [address] Patient Status:  Inpatient    1965 MRN [id number]   [facility] 3NE-A Attending Lon Mariano MD   Hosp Day # 0 PCP Bri Gonzalez     Date of Admission: 2018  Date of Disc incontinence.       Brief Synopsis: Patient presented with back pain. Imaging:  CONCLUSION:    1.   There are destructive lytic lesions involving the posterior right body and pedicle and lamina at T1 and T2.  2.  There is a pathologic fracture with a mix Tabs  Commonly known as:  ROXICODONE      Take 1 tablet (5 mg total) by mouth every 4 (four) hours as needed. Quantity:  30 tablet  Refills:  0     PEG 3350 Pack  Commonly known as:  MIRALAX      Take 17 g by mouth daily as needed.    Quantity:  30 each °C)-98.7 °F (37.1 °C)] 98.6 °F (37 °C)  Pulse:  [] 100  Resp:  [18] 18  BP: (108-141)/(76-98) 108/76    Physical Exam:    General: No acute distress. Respiratory: Clear to auscultation bilaterally. Cardiovascular: S1, S2. Regular rate and rhythm.

## 2018-12-24 NOTE — PLAN OF CARE
CARDIOVASCULAR - ADULT    • Absence of cardiac arrhythmias or at baseline Progressing        GASTROINTESTINAL - ADULT    • Minimal or absence of nausea and vomiting Progressing        Impaired Functional Mobility    • Achieve highest/safest level of mobili

## (undated) NOTE — ED AVS SNAPSHOT
Pooja Henriquez   MRN: [id number]    Department:  [facility] Emergency Department in [place]   Date of Visit:  1/22/2018           Disclosure     Insurance plans vary and the physician(s) referred by the ER may not be covered by your plan.  Please contac tell this physician (or your personal doctor if your instructions are to return to your personal doctor) about any new or lasting problems. The primary care or specialist physician will see patients referred from the [facility] Emergency Department.  Abraham Sotomayor

## (undated) NOTE — ED AVS SNAPSHOT
THE [facility] Emergency Department in [address] [facility]    Phone:  [phone]    Fax:  [phone] [facility]   MRN: [id number]    Department:  THE [facility] Emergency Department in [place]   Date of Visit: IF THERE IS ANY CHANGE OR WORSENING OF YOUR CONDITION, CALL YOUR PRIMARY CARE PHYSICIAN AT ONCE OR RETURN IMMEDIATELY TO THE EMERGENCY DEPARTMENT.     If you have been prescribed any medication(s), please fill your prescription right away and begin taking t

## (undated) NOTE — ED AVS SNAPSHOT
THE [facility] Emergency Department in [address] [facility]    Phone:  [phone]    Fax:  [phone] [facility]   MRN: [id number]    Department:  THE [facility] Emergency Department in [place]   Date of Visit: covered by your plan. Please contact your insurance company to determine coverage for follow-up care and referrals.     [address] [place] [phone]  Pediatric [phone] Emergency Department   [phone] to by a radiologist.  If there is a significant change in your reading, you will be contacted. Please make sure we have your correct phone number before you leave. After you leave, you should follow the attached instructions.      I have read and understand th [address]. MyChart     Sign up for [employer], your secure online medical record. [employer] will allow you to access patient instructions from your recent visit,  view other health information, and more.  To sign up or find more information,

## (undated) NOTE — ED AVS SNAPSHOT
[facility]   MRN: [id number]    Department:  [facility] Emergency Department in [place]   Date of Visit:  4/3/2018           Disclosure     Insurance plans vary and the physician(s) referred by the ER may not be covered by your plan.  Please contact tell this physician (or your personal doctor if your instructions are to return to your personal doctor) about any new or lasting problems. The primary care or specialist physician will see patients referred from the [facility] Emergency Department.  Kevin Andersen